# Patient Record
Sex: FEMALE | Race: OTHER | HISPANIC OR LATINO | URBAN - METROPOLITAN AREA
[De-identification: names, ages, dates, MRNs, and addresses within clinical notes are randomized per-mention and may not be internally consistent; named-entity substitution may affect disease eponyms.]

---

## 2019-12-05 VITALS
TEMPERATURE: 98 F | SYSTOLIC BLOOD PRESSURE: 104 MMHG | DIASTOLIC BLOOD PRESSURE: 68 MMHG | WEIGHT: 127.65 LBS | HEIGHT: 65 IN | HEART RATE: 89 BPM | OXYGEN SATURATION: 100 % | RESPIRATION RATE: 16 BRPM

## 2019-12-05 RX ORDER — INFLUENZA VIRUS VACCINE 15; 15; 15; 15 UG/.5ML; UG/.5ML; UG/.5ML; UG/.5ML
0.5 SUSPENSION INTRAMUSCULAR ONCE
Refills: 0 | Status: COMPLETED | OUTPATIENT
Start: 2019-12-06 | End: 2019-12-06

## 2019-12-05 RX ORDER — POVIDONE-IODINE 5 %
1 AEROSOL (ML) TOPICAL ONCE
Refills: 0 | Status: COMPLETED | OUTPATIENT
Start: 2019-12-06 | End: 2019-12-06
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
## 2019-12-05 NOTE — H&P ADULT - PROBLEM SELECTOR PLAN 1
Admit to Orthopedic Service   For elective posterior spinal fusion, lumbar laminectomy, bone graft L4-S1   Medically cleared and optimized for surgery by Admit to Orthopedic Service   For elective posterior spinal fusion, lumbar laminectomy, bone graft L4-S1 with Dr. Rodriguez   Medically cleared and optimized for surgery by Dr. Shields

## 2019-12-05 NOTE — H&P ADULT - NSHPLABSRESULTS_GEN_ALL_CORE
Preop CBC, BMP, PT/INR, within normal range  PTT DOS   UA negative   3M DOS   T + S DOS   Preop CXR within normal limits, reviewed per medical clearance   Preop EKG NSR and reviewed per medical clearance

## 2019-12-05 NOTE — H&P ADULT - NSHPPHYSICALEXAM_GEN_ALL_CORE
Gen: 45 y/o female, well nourished, well developed, NAD  MSK: Decreased ROM secondary to pain at the lumbar spine     Rest of PE per MD clearance Gen: 45 y/o female, well nourished, well developed, NAD  MSK: Decreased ROM secondary to pain at the lumbar spine   calves soft, nontender bilaterally   sensation intact to light touch bilateral lower extremities  DP 2+,  brisk capillary refill  EHL/FHL/TA/GS 5/5 bilaterally     Rest of PE per MD clearance

## 2019-12-06 ENCOUNTER — INPATIENT (INPATIENT)
Facility: HOSPITAL | Age: 44
LOS: 4 days | Discharge: HOME | DRG: 460 | End: 2019-12-11
Attending: ORTHOPAEDIC SURGERY | Admitting: ORTHOPAEDIC SURGERY
Payer: COMMERCIAL

## 2019-12-06 DIAGNOSIS — M54.17 RADICULOPATHY, LUMBOSACRAL REGION: ICD-10-CM

## 2019-12-06 DIAGNOSIS — E86.0 DEHYDRATION: ICD-10-CM

## 2019-12-06 DIAGNOSIS — L23.1 ALLERGIC CONTACT DERMATITIS DUE TO ADHESIVES: ICD-10-CM

## 2019-12-06 DIAGNOSIS — M51.26 OTHER INTERVERTEBRAL DISC DISPLACEMENT, LUMBAR REGION: ICD-10-CM

## 2019-12-06 DIAGNOSIS — M54.16 RADICULOPATHY, LUMBAR REGION: ICD-10-CM

## 2019-12-06 DIAGNOSIS — Z90.710 ACQUIRED ABSENCE OF BOTH CERVIX AND UTERUS: Chronic | ICD-10-CM

## 2019-12-06 DIAGNOSIS — Z90.710 ACQUIRED ABSENCE OF BOTH CERVIX AND UTERUS: ICD-10-CM

## 2019-12-06 DIAGNOSIS — G99.2 MYELOPATHY IN DISEASES CLASSIFIED ELSEWHERE: ICD-10-CM

## 2019-12-06 DIAGNOSIS — T40.2X5A ADVERSE EFFECT OF OTHER OPIOIDS, INITIAL ENCOUNTER: ICD-10-CM

## 2019-12-06 DIAGNOSIS — R33.9 RETENTION OF URINE, UNSPECIFIED: ICD-10-CM

## 2019-12-06 DIAGNOSIS — R65.10 SYSTEMIC INFLAMMATORY RESPONSE SYNDROME (SIRS) OF NON-INFECTIOUS ORIGIN WITHOUT ACUTE ORGAN DYSFUNCTION: ICD-10-CM

## 2019-12-06 DIAGNOSIS — K59.00 CONSTIPATION, UNSPECIFIED: ICD-10-CM

## 2019-12-06 DIAGNOSIS — M53.2X7 SPINAL INSTABILITIES, LUMBOSACRAL REGION: ICD-10-CM

## 2019-12-06 DIAGNOSIS — M48.07 SPINAL STENOSIS, LUMBOSACRAL REGION: ICD-10-CM

## 2019-12-06 DIAGNOSIS — M48.061 SPINAL STENOSIS, LUMBAR REGION WITHOUT NEUROGENIC CLAUDICATION: ICD-10-CM

## 2019-12-06 DIAGNOSIS — S33.101A DISLOCATION OF UNSPECIFIED LUMBAR VERTEBRA, INITIAL ENCOUNTER: ICD-10-CM

## 2019-12-06 DIAGNOSIS — S33.141A DISLOCATION OF L4/L5 LUMBAR VERTEBRA, INITIAL ENCOUNTER: ICD-10-CM

## 2019-12-06 DIAGNOSIS — I95.9 HYPOTENSION, UNSPECIFIED: ICD-10-CM
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
## 2019-12-06 DIAGNOSIS — R55 SYNCOPE AND COLLAPSE: ICD-10-CM

## 2019-12-06 LAB — GLUCOSE BLDC GLUCOMTR-MCNC: 118 MG/DL — HIGH (ref 70–99)

## 2019-12-06 RX ORDER — ONDANSETRON 8 MG/1
4 TABLET, FILM COATED ORAL EVERY 6 HOURS
Refills: 0 | Status: DISCONTINUED | OUTPATIENT
Start: 2019-12-06 | End: 2019-12-06

## 2019-12-06 RX ORDER — OXYCODONE HYDROCHLORIDE 5 MG/1
10 TABLET ORAL EVERY 4 HOURS
Refills: 0 | Status: DISCONTINUED | OUTPATIENT
Start: 2019-12-06 | End: 2019-12-06

## 2019-12-06 RX ORDER — NALOXONE HYDROCHLORIDE 4 MG/.1ML
0.1 SPRAY NASAL
Refills: 0 | Status: DISCONTINUED | OUTPATIENT
Start: 2019-12-06 | End: 2019-12-11

## 2019-12-06 RX ORDER — CHLORHEXIDINE GLUCONATE 213 G/1000ML
1 SOLUTION TOPICAL ONCE
Refills: 0 | Status: COMPLETED | OUTPATIENT
Start: 2019-12-06 | End: 2019-12-06

## 2019-12-06 RX ORDER — VANCOMYCIN HCL 1 G
1000 VIAL (EA) INTRAVENOUS EVERY 12 HOURS
Refills: 0 | Status: DISCONTINUED | OUTPATIENT
Start: 2019-12-06 | End: 2019-12-06

## 2019-12-06 RX ORDER — ACETAMINOPHEN 500 MG
1000 TABLET ORAL ONCE
Refills: 0 | Status: COMPLETED | OUTPATIENT
Start: 2019-12-06 | End: 2019-12-07

## 2019-12-06 RX ORDER — VANCOMYCIN HCL 1 G
1000 VIAL (EA) INTRAVENOUS EVERY 12 HOURS
Refills: 0 | Status: COMPLETED | OUTPATIENT
Start: 2019-12-06 | End: 2019-12-08

## 2019-12-06 RX ORDER — HYDROMORPHONE HYDROCHLORIDE 2 MG/ML
0.5 INJECTION INTRAMUSCULAR; INTRAVENOUS; SUBCUTANEOUS
Refills: 0 | Status: DISCONTINUED | OUTPATIENT
Start: 2019-12-06 | End: 2019-12-06

## 2019-12-06 RX ORDER — ONDANSETRON 8 MG/1
4 TABLET, FILM COATED ORAL EVERY 6 HOURS
Refills: 0 | Status: DISCONTINUED | OUTPATIENT
Start: 2019-12-06 | End: 2019-12-11

## 2019-12-06 RX ORDER — HYDROMORPHONE HYDROCHLORIDE 2 MG/ML
30 INJECTION INTRAMUSCULAR; INTRAVENOUS; SUBCUTANEOUS
Refills: 0 | Status: DISCONTINUED | OUTPATIENT
Start: 2019-12-06 | End: 2019-12-08

## 2019-12-06 RX ORDER — ACETAMINOPHEN 500 MG
650 TABLET ORAL EVERY 6 HOURS
Refills: 0 | Status: DISCONTINUED | OUTPATIENT
Start: 2019-12-06 | End: 2019-12-09

## 2019-12-06 RX ORDER — SODIUM CHLORIDE 9 MG/ML
1000 INJECTION, SOLUTION INTRAVENOUS
Refills: 0 | Status: DISCONTINUED | OUTPATIENT
Start: 2019-12-06 | End: 2019-12-09

## 2019-12-06 RX ORDER — CYCLOBENZAPRINE HYDROCHLORIDE 10 MG/1
5 TABLET, FILM COATED ORAL THREE TIMES A DAY
Refills: 0 | Status: DISCONTINUED | OUTPATIENT
Start: 2019-12-06 | End: 2019-12-11

## 2019-12-06 RX ORDER — KETOROLAC TROMETHAMINE 30 MG/ML
30 SYRINGE (ML) INJECTION ONCE
Refills: 0 | Status: DISCONTINUED | OUTPATIENT
Start: 2019-12-06 | End: 2019-12-06

## 2019-12-06 RX ORDER — HYDROMORPHONE HYDROCHLORIDE 2 MG/ML
0.5 INJECTION INTRAMUSCULAR; INTRAVENOUS; SUBCUTANEOUS
Refills: 0 | Status: DISCONTINUED | OUTPATIENT
Start: 2019-12-06 | End: 2019-12-08

## 2019-12-06 RX ORDER — SENNA PLUS 8.6 MG/1
2 TABLET ORAL AT BEDTIME
Refills: 0 | Status: DISCONTINUED | OUTPATIENT
Start: 2019-12-06 | End: 2019-12-11

## 2019-12-06 RX ORDER — ACETAMINOPHEN, PHENYLEPHRINE HCL 325; 5 MG/1; MG/1
0 TABLET ORAL
Qty: 0 | Refills: 0 | DISCHARGE

## 2019-12-06 RX ORDER — HYDROMORPHONE HYDROCHLORIDE 2 MG/ML
1 INJECTION INTRAMUSCULAR; INTRAVENOUS; SUBCUTANEOUS EVERY 4 HOURS
Refills: 0 | Status: DISCONTINUED | OUTPATIENT
Start: 2019-12-06 | End: 2019-12-09

## 2019-12-06 RX ADMIN — SODIUM CHLORIDE 80 MILLILITER(S): 9 INJECTION, SOLUTION INTRAVENOUS at 20:06

## 2019-12-06 RX ADMIN — HYDROMORPHONE HYDROCHLORIDE 0.5 MILLIGRAM(S): 2 INJECTION INTRAMUSCULAR; INTRAVENOUS; SUBCUTANEOUS at 12:00

## 2019-12-06 RX ADMIN — HYDROMORPHONE HYDROCHLORIDE 0.5 MILLIGRAM(S): 2 INJECTION INTRAMUSCULAR; INTRAVENOUS; SUBCUTANEOUS at 11:33

## 2019-12-06 RX ADMIN — Medication 1 APPLICATION(S): at 06:45

## 2019-12-06 RX ADMIN — HYDROMORPHONE HYDROCHLORIDE 0.5 MILLIGRAM(S): 2 INJECTION INTRAMUSCULAR; INTRAVENOUS; SUBCUTANEOUS at 11:14

## 2019-12-06 RX ADMIN — SODIUM CHLORIDE 80 MILLILITER(S): 9 INJECTION, SOLUTION INTRAVENOUS at 11:14

## 2019-12-06 RX ADMIN — HYDROMORPHONE HYDROCHLORIDE 0.5 MILLIGRAM(S): 2 INJECTION INTRAMUSCULAR; INTRAVENOUS; SUBCUTANEOUS at 12:31

## 2019-12-06 RX ADMIN — HYDROMORPHONE HYDROCHLORIDE 0.5 MILLIGRAM(S): 2 INJECTION INTRAMUSCULAR; INTRAVENOUS; SUBCUTANEOUS at 11:30

## 2019-12-06 RX ADMIN — Medication 30 MILLIGRAM(S): at 13:14

## 2019-12-06 RX ADMIN — HYDROMORPHONE HYDROCHLORIDE 30 MILLILITER(S): 2 INJECTION INTRAMUSCULAR; INTRAVENOUS; SUBCUTANEOUS at 14:00

## 2019-12-06 RX ADMIN — HYDROMORPHONE HYDROCHLORIDE 0.5 MILLIGRAM(S): 2 INJECTION INTRAMUSCULAR; INTRAVENOUS; SUBCUTANEOUS at 12:45

## 2019-12-06 RX ADMIN — Medication 250 MILLIGRAM(S): at 20:06

## 2019-12-06 RX ADMIN — CHLORHEXIDINE GLUCONATE 1 APPLICATION(S): 213 SOLUTION TOPICAL at 06:46

## 2019-12-06 RX ADMIN — CYCLOBENZAPRINE HYDROCHLORIDE 5 MILLIGRAM(S): 10 TABLET, FILM COATED ORAL at 16:59

## 2019-12-06 RX ADMIN — SENNA PLUS 2 TABLET(S): 8.6 TABLET ORAL at 22:18
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
## 2019-12-06 NOTE — PROGRESS NOTE ADULT - SUBJECTIVE AND OBJECTIVE BOX
Ortho Post Op Check    Procedure: PSF L4-S1  Surgeon: Dr. Rodriguez    Pt comfortable without complaints, pain controlled  Denies CP, SOB, N/V, numbness/tingling     Vital Signs Last 24 Hrs  T(C): 36.3 (12-06-19 @ 10:50), Max: 36.3 (12-06-19 @ 10:50)  T(F): 97.4 (12-06-19 @ 10:50), Max: 97.4 (12-06-19 @ 10:50)  HR: 74 (12-06-19 @ 12:33) (67 - 84)  BP: 109/72 (12-06-19 @ 12:33) (105/71 - 131/84)  BP(mean): 86 (12-06-19 @ 12:33) (84 - 101)  RR: 12 (12-06-19 @ 12:33) (12 - 15)  SpO2: 100% (12-06-19 @ 12:33) (97% - 100%)    General: Pt Alert and oriented, NAD  DSG C/D/I back  Pulses intact b/l LE  Sensation intact b/l LE  Motor: EHL/FHL/TA/GS 5/5 b/l    A/P: 44yFemale POD#0 s/p PSF L4-S1  - Stable  - Pain Control  - DVT ppx: scds  - Post op abx: vanco  - PT, WBS: wbat    Ortho Pager [phone]

## 2019-12-06 NOTE — BRIEF OPERATIVE NOTE - NSICDXBRIEFPROCEDURE_GEN_ALL_CORE_FT
PROCEDURES:  Fusion, posterior spinal column, lumbar, 2 levels, posterior approach 06-Dec-2019 07:15:10  Martin Alonzo

## 2019-12-06 NOTE — PACU DISCHARGE NOTE - COMMENTS
pt met criteria for discharge-alert and oriented x4- follow commands and moving all extremities- PCA pump in progress for Pain- s/p L4-S1 posterior fusion with hardware with abdominal pad dressing with betadine - pt tolerating po intake- 675 ml of urine obtained via bladder scan- orthopedic notified- as per MD order- straight cath pt and 800ml of urine obtained-report given to 9 kyle nurse- pt left unit via bed on PACA and supplemental oxygen to 949-1 accompanied by RN and PCa

## 2019-12-06 NOTE — PACU DISCHARGE NOTE - COMMENTS
pt met criteria for discharge-alert and oriented x4- follow commands and moving all extremities- s/p L4-S1 posterior fusion with hardware- back Pain with bilateral lower extremities weakness at baseline- pt hemodynamically stable at present- [facility] pt left unit via stretcher to 540-1

## 2019-12-07 LAB
ANION GAP SERPL CALC-SCNC: 8 MMOL/L — SIGNIFICANT CHANGE UP (ref 5–17)
BASOPHILS # BLD AUTO: 0.02 K/UL — SIGNIFICANT CHANGE UP (ref 0–0.2)
BASOPHILS NFR BLD AUTO: 0.3 % — SIGNIFICANT CHANGE UP (ref 0–2)
BUN SERPL-MCNC: 3 MG/DL — LOW (ref 7–23)
CALCIUM SERPL-MCNC: 8.8 MG/DL — SIGNIFICANT CHANGE UP (ref 8.4–10.5)
CHLORIDE SERPL-SCNC: 103 MMOL/L — SIGNIFICANT CHANGE UP (ref 96–108)
CO2 SERPL-SCNC: 27 MMOL/L — SIGNIFICANT CHANGE UP (ref 22–31)
CREAT SERPL-MCNC: 0.62 MG/DL — SIGNIFICANT CHANGE UP (ref 0.5–1.3)
EOSINOPHIL # BLD AUTO: 0 K/UL — SIGNIFICANT CHANGE UP (ref 0–0.5)
EOSINOPHIL NFR BLD AUTO: 0 % — SIGNIFICANT CHANGE UP (ref 0–6)
GLUCOSE SERPL-MCNC: 138 MG/DL — HIGH (ref 70–99)
HCT VFR BLD CALC: 38.8 % — SIGNIFICANT CHANGE UP (ref 34.5–45)
HGB BLD-MCNC: 12.6 G/DL — SIGNIFICANT CHANGE UP (ref 11.5–15.5)
IMM GRANULOCYTES NFR BLD AUTO: 0.4 % — SIGNIFICANT CHANGE UP (ref 0–1.5)
LYMPHOCYTES # BLD AUTO: 1.64 K/UL — SIGNIFICANT CHANGE UP (ref 1–3.3)
LYMPHOCYTES # BLD AUTO: 23.5 % — SIGNIFICANT CHANGE UP (ref 13–44)
MCHC RBC-ENTMCNC: 31 PG — SIGNIFICANT CHANGE UP (ref 27–34)
MCHC RBC-ENTMCNC: 32.5 GM/DL — SIGNIFICANT CHANGE UP (ref 32–36)
MCV RBC AUTO: 95.3 FL — SIGNIFICANT CHANGE UP (ref 80–100)
MONOCYTES # BLD AUTO: 0.51 K/UL — SIGNIFICANT CHANGE UP (ref 0–0.9)
MONOCYTES NFR BLD AUTO: 7.3 % — SIGNIFICANT CHANGE UP (ref 2–14)
NEUTROPHILS # BLD AUTO: 4.78 K/UL — SIGNIFICANT CHANGE UP (ref 1.8–7.4)
NEUTROPHILS NFR BLD AUTO: 68.5 % — SIGNIFICANT CHANGE UP (ref 43–77)
NRBC # BLD: 0 /100 WBCS — SIGNIFICANT CHANGE UP (ref 0–0)
PLATELET # BLD AUTO: 208 K/UL — SIGNIFICANT CHANGE UP (ref 150–400)
POTASSIUM SERPL-MCNC: 4.2 MMOL/L — SIGNIFICANT CHANGE UP (ref 3.5–5.3)
POTASSIUM SERPL-SCNC: 4.2 MMOL/L — SIGNIFICANT CHANGE UP (ref 3.5–5.3)
RBC # BLD: 4.07 M/UL — SIGNIFICANT CHANGE UP (ref 3.8–5.2)
RBC # FLD: 11.9 % — SIGNIFICANT CHANGE UP (ref 10.3–14.5)
SODIUM SERPL-SCNC: 138 MMOL/L — SIGNIFICANT CHANGE UP (ref 135–145)
WBC # BLD: 6.98 K/UL — SIGNIFICANT CHANGE UP (ref 3.8–10.5)
WBC # FLD AUTO: 6.98 K/UL — SIGNIFICANT CHANGE UP (ref 3.8–10.5)

## 2019-12-07 RX ADMIN — Medication 650 MILLIGRAM(S): at 22:06

## 2019-12-07 RX ADMIN — CYCLOBENZAPRINE HYDROCHLORIDE 5 MILLIGRAM(S): 10 TABLET, FILM COATED ORAL at 18:26

## 2019-12-07 RX ADMIN — HYDROMORPHONE HYDROCHLORIDE 0.5 MILLIGRAM(S): 2 INJECTION INTRAMUSCULAR; INTRAVENOUS; SUBCUTANEOUS at 18:30

## 2019-12-07 RX ADMIN — Medication 400 MILLIGRAM(S): at 11:11

## 2019-12-07 RX ADMIN — SODIUM CHLORIDE 80 MILLILITER(S): 9 INJECTION, SOLUTION INTRAVENOUS at 09:00

## 2019-12-07 RX ADMIN — Medication 250 MILLIGRAM(S): at 19:01

## 2019-12-07 RX ADMIN — Medication 1000 MILLIGRAM(S): at 11:30

## 2019-12-07 RX ADMIN — Medication 650 MILLIGRAM(S): at 21:06

## 2019-12-07 RX ADMIN — CYCLOBENZAPRINE HYDROCHLORIDE 5 MILLIGRAM(S): 10 TABLET, FILM COATED ORAL at 09:50

## 2019-12-07 RX ADMIN — SENNA PLUS 2 TABLET(S): 8.6 TABLET ORAL at 21:07

## 2019-12-07 RX ADMIN — SODIUM CHLORIDE 80 MILLILITER(S): 9 INJECTION, SOLUTION INTRAVENOUS at 23:59

## 2019-12-07 RX ADMIN — Medication 250 MILLIGRAM(S): at 07:30

## 2019-12-07 RX ADMIN — HYDROMORPHONE HYDROCHLORIDE 30 MILLILITER(S): 2 INJECTION INTRAMUSCULAR; INTRAVENOUS; SUBCUTANEOUS at 18:15

## 2019-12-07 NOTE — PHYSICAL THERAPY INITIAL EVALUATION ADULT - GAIT DEVIATIONS NOTED, PT EVAL
decreased step length/increased time in double stance/decreased stride length/decreased swing-to-stance ratio/decreased velocity of limb motion

## 2019-12-07 NOTE — DISCHARGE NOTE PROVIDER - HOSPITAL COURSE
Admitted    Surgery - L4-S1 PSF    Yenifer-op Antibiotics    Pain control    DVT prophylaxis    OOB/Physical Therapy Admitted    Surgery - L4-S1 PSF    Yenifer-op Antibiotics    Pain control    DVT prophylaxis    Urinary retention requiring miller catheter - removed and passed trial of void on 12/10/2019    OOB/Physical Therapy

## 2019-12-07 NOTE — PHYSICAL THERAPY INITIAL EVALUATION ADULT - PLANNED THERAPY INTERVENTIONS, PT EVAL
gait training/postural re-education/strengthening/balance training/transfer training/neuromuscular re-education/bed mobility training

## 2019-12-07 NOTE — PHYSICAL THERAPY INITIAL EVALUATION ADULT - GENERAL OBSERVATIONS, REHAB EVAL
Patient received semi-rocha in bed  in NAD on O2, +SCDs, +IV, +PCA.  Cleared by POLLY Mortensen. Agreeable to PT.

## 2019-12-07 NOTE — DISCHARGE NOTE PROVIDER - NSDCACTIVITY_GEN_ALL_CORE
No heavy lifting/straining/Showering allowed/Walking - Indoors allowed/Do not drive or operate machinery Walking - Outdoors allowed/Walking - Indoors allowed/No heavy lifting/straining/Do not drive or operate machinery/Stairs allowed/Showering allowed

## 2019-12-07 NOTE — PROGRESS NOTE ADULT - SUBJECTIVE AND OBJECTIVE BOX
Ortho Post Op Check    Pt comfortable without complaints, pain controlled. Straight cathx2 overnight, TOV this AM  Denies CP, SOB, N/V, numbness/tingling     Vital Signs Last 24 Hrs  T(C): 36.7 (07 Dec 2019 05:06), Max: 36.7 (07 Dec 2019 05:06)  T(F): 98 (07 Dec 2019 05:06), Max: 98 (07 Dec 2019 05:06)  HR: 69 (07 Dec 2019 05:06) (62 - 86)  BP: 98/61 (07 Dec 2019 05:06) (91/54 - 131/84)  BP(mean): 77 (06 Dec 2019 19:18) (67 - 101)  RR: 15 (07 Dec 2019 05:06) (12 - 15)  SpO2: 99% (07 Dec 2019 05:06) (97% - 100%)    General: Pt Alert and oriented, NAD  DSG C/D/I back  Pulses intact b/l LE  Sensation intact b/l LE  Motor: EHL/FHL/TA/GS 5/5 b/l    A/P: 44yFemale s/p PSF L4-S1 12/6/19  - Stable  - Pain Control  - DVT ppx: scds  - Post op abx: vanco  - PT, WBS: wbat    Ortho Pager [phone]

## 2019-12-07 NOTE — DISCHARGE NOTE PROVIDER - NSDCCPCAREPLAN_GEN_ALL_CORE_FT
PRINCIPAL DISCHARGE DIAGNOSIS  Diagnosis: HNP (herniated nucleus pulposus), lumbar  Assessment and Plan of Treatment: HNP (herniated nucleus pulposus), lumbar
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
## 2019-12-07 NOTE — DISCHARGE NOTE PROVIDER - CARE PROVIDER_API CALL
Pradeep Rodriguez)  Orthopaedic Surgery; Spine Surgery  [address] [place] [address]  Phone: [phone]  Fax: [phone]  Follow Up Time: Pradeep Rodriguez)  Orthopaedic Surgery; Spine Surgery  [address] [place] [address]  Phone: [phone]  Fax: [phone]  Follow Up Time: 1 week

## 2019-12-07 NOTE — DISCHARGE NOTE PROVIDER - NSDCCPTREATMENT_GEN_ALL_CORE_FT
PRINCIPAL PROCEDURE  Procedure: Fusion, posterior spinal column, lumbar, 2 levels, posterior approach  Findings and Treatment:

## 2019-12-07 NOTE — PHYSICAL THERAPY INITIAL EVALUATION ADULT - CRITERIA FOR SKILLED THERAPEUTIC INTERVENTIONS
impairments found/therapy frequency/risk reduction/prevention/anticipated discharge recommendation/predicted duration of therapy intervention/anticipated equipment needs at discharge/functional limitations in following categories

## 2019-12-07 NOTE — DISCHARGE NOTE PROVIDER - NSDCMRMEDTOKEN_GEN_ALL_CORE_FT
ibuprofen 800 mg oral tablet: 1 tab(s) orally 3 times a day  tylenol sinus: acetaminophen 10 mg/mL intravenous solution: 100 milliliter(s) intravenous once  acetaminophen 325 mg oral tablet: 3 tab(s) orally every 8 hours  cyclobenzaprine 5 mg oral tablet: 1 tab(s) orally 3 times a day, As needed, Muscle Spasm MDD:3  oxyCODONE 5 mg oral tablet: 1-2  tab(s) orally every 6 hours, As needed, severe pain MDD:4  senna oral tablet: 2 tab(s) orally once a day (at bedtime) acetaminophen 325 mg oral tablet: 3 tab(s) orally every 8 hours, As Needed mild pain  cyclobenzaprine 5 mg oral tablet: 1 tab(s) orally 3 times a day, As needed, Muscle Spasm MDD:3  oxyCODONE 5 mg oral tablet: 1-2  tab(s) orally every 6 hours, As needed, severe pain MDD:4  senna oral tablet: 2 tab(s) orally once a day (at bedtime)

## 2019-12-07 NOTE — PHYSICAL THERAPY INITIAL EVALUATION ADULT - ADDITIONAL COMMENTS
Patient will be staying with her sister in apartment building with 5-6 steps to enter. Patient reports use of a back brace for comfort with some efficacy. She denies use of a cane or walker for assistance. Denies recent Hx of falls.

## 2019-12-07 NOTE — DISCHARGE NOTE PROVIDER - NSDCFUADDINST_GEN_ALL_CORE_FT
No strenuous activity (bending/twisting), heavy lifting, driving or returning to work until cleared by MD.  Change dressing daily with gauze/tape till post-op day #5, then leave incision open to air.  You may shower post-op day#5, keep incision clean and dry.   Try to have regular bowel movements, take stool softener or laxative if necessary.  May take pepcid or prilosec for upset stomach.  May apply ice to affected areas to decrease swelling.  Call to schedule an appt with Dr. Rodriguez for follow up, if you have staples or sutures they will be removed in office.  Contact your doctor if you experience: fever greater than 101.5, chills, chest pain, difficulty breathing, redness or excessive drainage around the incision, other concerns.  Follow up with your primary care provider. No strenuous activity (bending/twisting), heavy lifting, driving or returning to work until cleared by MD.  You can shower. Keep back dry.  Try to have regular bowel movements, take stool softener or laxative if necessary.  May take pepcid or prilosec for upset stomach.  May apply ice to affected areas to decrease swelling.  Call to schedule an appt with Dr. Rodriguez for follow up, if you have staples or sutures they will be removed in office.  Contact your doctor if you experience: fever greater than 101.5, chills, chest pain, difficulty breathing, redness or excessive drainage around the incision, other concerns.  Follow up with your primary care provider. Neosporin or hydrocortisone to back rash twice a day.  No strenuous activity (bending/twisting), heavy lifting, driving or returning to work until cleared by MD.  You can shower. Keep back dry.  Try to have regular bowel movements, take stool softener or laxative if necessary.  May take pepcid or prilosec for upset stomach.  May apply ice to affected areas to decrease swelling.  Call to schedule an appt with Dr. Rodriguez for follow up, if you have staples or sutures they will be removed in office.  Contact your doctor if you experience: fever greater than 101.5, chills, chest pain, difficulty breathing, redness or excessive drainage around the incision, other concerns.  Follow up with your primary care provider.

## 2019-12-08 LAB
ANION GAP SERPL CALC-SCNC: 7 MMOL/L — SIGNIFICANT CHANGE UP (ref 5–17)
BUN SERPL-MCNC: 2 MG/DL — LOW (ref 7–23)
CALCIUM SERPL-MCNC: 8.8 MG/DL — SIGNIFICANT CHANGE UP (ref 8.4–10.5)
CHLORIDE SERPL-SCNC: 102 MMOL/L — SIGNIFICANT CHANGE UP (ref 96–108)
CO2 SERPL-SCNC: 30 MMOL/L — SIGNIFICANT CHANGE UP (ref 22–31)
CREAT SERPL-MCNC: 0.66 MG/DL — SIGNIFICANT CHANGE UP (ref 0.5–1.3)
GLUCOSE SERPL-MCNC: 105 MG/DL — HIGH (ref 70–99)
HCT VFR BLD CALC: 39.6 % — SIGNIFICANT CHANGE UP (ref 34.5–45)
HGB BLD-MCNC: 12.5 G/DL — SIGNIFICANT CHANGE UP (ref 11.5–15.5)
MCHC RBC-ENTMCNC: 30.4 PG — SIGNIFICANT CHANGE UP (ref 27–34)
MCHC RBC-ENTMCNC: 31.6 GM/DL — LOW (ref 32–36)
MCV RBC AUTO: 96.4 FL — SIGNIFICANT CHANGE UP (ref 80–100)
NRBC # BLD: 0 /100 WBCS — SIGNIFICANT CHANGE UP (ref 0–0)
PLATELET # BLD AUTO: 183 K/UL — SIGNIFICANT CHANGE UP (ref 150–400)
POTASSIUM SERPL-MCNC: 3.6 MMOL/L — SIGNIFICANT CHANGE UP (ref 3.5–5.3)
POTASSIUM SERPL-SCNC: 3.6 MMOL/L — SIGNIFICANT CHANGE UP (ref 3.5–5.3)
RBC # BLD: 4.11 M/UL — SIGNIFICANT CHANGE UP (ref 3.8–5.2)
RBC # FLD: 11.7 % — SIGNIFICANT CHANGE UP (ref 10.3–14.5)
SODIUM SERPL-SCNC: 139 MMOL/L — SIGNIFICANT CHANGE UP (ref 135–145)
WBC # BLD: 6.75 K/UL — SIGNIFICANT CHANGE UP (ref 3.8–10.5)
WBC # FLD AUTO: 6.75 K/UL — SIGNIFICANT CHANGE UP (ref 3.8–10.5)

## 2019-12-08 RX ORDER — TAMSULOSIN HYDROCHLORIDE 0.4 MG/1
0.4 CAPSULE ORAL ONCE
Refills: 0 | Status: COMPLETED | OUTPATIENT
Start: 2019-12-08 | End: 2019-12-08

## 2019-12-08 RX ORDER — OXYCODONE HYDROCHLORIDE 5 MG/1
10 TABLET ORAL EVERY 4 HOURS
Refills: 0 | Status: DISCONTINUED | OUTPATIENT
Start: 2019-12-08 | End: 2019-12-09

## 2019-12-08 RX ORDER — POTASSIUM CHLORIDE 20 MEQ
40 PACKET (EA) ORAL ONCE
Refills: 0 | Status: COMPLETED | OUTPATIENT
Start: 2019-12-08 | End: 2019-12-08

## 2019-12-08 RX ORDER — OXYCODONE HYDROCHLORIDE 5 MG/1
5 TABLET ORAL EVERY 4 HOURS
Refills: 0 | Status: DISCONTINUED | OUTPATIENT
Start: 2019-12-08 | End: 2019-12-09

## 2019-12-08 RX ADMIN — CYCLOBENZAPRINE HYDROCHLORIDE 5 MILLIGRAM(S): 10 TABLET, FILM COATED ORAL at 18:08

## 2019-12-08 RX ADMIN — OXYCODONE HYDROCHLORIDE 10 MILLIGRAM(S): 5 TABLET ORAL at 18:30

## 2019-12-08 RX ADMIN — OXYCODONE HYDROCHLORIDE 10 MILLIGRAM(S): 5 TABLET ORAL at 18:08

## 2019-12-08 RX ADMIN — OXYCODONE HYDROCHLORIDE 10 MILLIGRAM(S): 5 TABLET ORAL at 08:38

## 2019-12-08 RX ADMIN — TAMSULOSIN HYDROCHLORIDE 0.4 MILLIGRAM(S): 0.4 CAPSULE ORAL at 08:39

## 2019-12-08 RX ADMIN — OXYCODONE HYDROCHLORIDE 10 MILLIGRAM(S): 5 TABLET ORAL at 13:33

## 2019-12-08 RX ADMIN — Medication 650 MILLIGRAM(S): at 11:20

## 2019-12-08 RX ADMIN — Medication 650 MILLIGRAM(S): at 19:10

## 2019-12-08 RX ADMIN — Medication 650 MILLIGRAM(S): at 05:03

## 2019-12-08 RX ADMIN — CYCLOBENZAPRINE HYDROCHLORIDE 5 MILLIGRAM(S): 10 TABLET, FILM COATED ORAL at 08:37

## 2019-12-08 RX ADMIN — Medication 650 MILLIGRAM(S): at 04:03

## 2019-12-08 RX ADMIN — Medication 650 MILLIGRAM(S): at 19:30

## 2019-12-08 RX ADMIN — SENNA PLUS 2 TABLET(S): 8.6 TABLET ORAL at 21:44

## 2019-12-08 RX ADMIN — SODIUM CHLORIDE 80 MILLILITER(S): 9 INJECTION, SOLUTION INTRAVENOUS at 13:33

## 2019-12-08 RX ADMIN — Medication 650 MILLIGRAM(S): at 11:40

## 2019-12-08 RX ADMIN — CYCLOBENZAPRINE HYDROCHLORIDE 5 MILLIGRAM(S): 10 TABLET, FILM COATED ORAL at 21:45

## 2019-12-08 RX ADMIN — OXYCODONE HYDROCHLORIDE 10 MILLIGRAM(S): 5 TABLET ORAL at 13:50

## 2019-12-08 RX ADMIN — Medication 40 MILLIEQUIVALENT(S): at 10:08

## 2019-12-08 RX ADMIN — HYDROMORPHONE HYDROCHLORIDE 1 MILLIGRAM(S): 2 INJECTION INTRAMUSCULAR; INTRAVENOUS; SUBCUTANEOUS at 10:08

## 2019-12-08 RX ADMIN — OXYCODONE HYDROCHLORIDE 10 MILLIGRAM(S): 5 TABLET ORAL at 09:15

## 2019-12-08 RX ADMIN — HYDROMORPHONE HYDROCHLORIDE 1 MILLIGRAM(S): 2 INJECTION INTRAMUSCULAR; INTRAVENOUS; SUBCUTANEOUS at 10:20

## 2019-12-08 RX ADMIN — Medication 250 MILLIGRAM(S): at 08:06

## 2019-12-08 NOTE — PROGRESS NOTE ADULT - SUBJECTIVE AND OBJECTIVE BOX
Ortho Post Op Check    Pt comfortable without complaints, pain controlled. Failed TOV, miller placed.   Denies CP, SOB, N/V, numbness/tingling     Vital Signs Last 24 Hrs  T(C): 37.6 (08 Dec 2019 05:34), Max: 38.9 (07 Dec 2019 20:30)  T(F): 99.6 (08 Dec 2019 05:34), Max: 102 (07 Dec 2019 20:30)  HR: 106 (08 Dec 2019 05:34) (80 - 106)  BP: 101/64 (08 Dec 2019 05:34) (97/63 - 115/72)  BP(mean): --  RR: 17 (08 Dec 2019 05:34) (16 - 18)  SpO2: 100% (08 Dec 2019 05:34) (98% - 100%)    General: Pt Alert and oriented, NAD  DSG C/D/I back  Pulses intact b/l LE  Sensation intact b/l LE  Motor: EHL/FHL/TA/GS 5/5 b/l    A/P: 44yFemale s/p PSF L4-S1 12/6/19  - Stable  - Pain Control  - DVT ppx: scds  - Post op abx: vanco  - PT, WBS: wbat  - Dispo: pending PT clearance, for home    Ortho Pager [phone]

## 2019-12-09 LAB
ALBUMIN SERPL ELPH-MCNC: 2.9 G/DL — LOW (ref 3.3–5)
ALBUMIN SERPL ELPH-MCNC: 2.9 G/DL — LOW (ref 3.3–5)
ALP SERPL-CCNC: 47 U/L — SIGNIFICANT CHANGE UP (ref 40–120)
ALP SERPL-CCNC: 51 U/L — SIGNIFICANT CHANGE UP (ref 40–120)
ALT FLD-CCNC: 23 U/L — SIGNIFICANT CHANGE UP (ref 10–45)
ALT FLD-CCNC: 25 U/L — SIGNIFICANT CHANGE UP (ref 10–45)
ANION GAP SERPL CALC-SCNC: 8 MMOL/L — SIGNIFICANT CHANGE UP (ref 5–17)
ANION GAP SERPL CALC-SCNC: 9 MMOL/L — SIGNIFICANT CHANGE UP (ref 5–17)
AST SERPL-CCNC: 36 U/L — SIGNIFICANT CHANGE UP (ref 10–40)
AST SERPL-CCNC: 38 U/L — SIGNIFICANT CHANGE UP (ref 10–40)
BILIRUB DIRECT SERPL-MCNC: <0.2 MG/DL — SIGNIFICANT CHANGE UP (ref 0–0.2)
BILIRUB INDIRECT FLD-MCNC: >0 MG/DL — LOW (ref 0.2–1)
BILIRUB SERPL-MCNC: 0.2 MG/DL — SIGNIFICANT CHANGE UP (ref 0.2–1.2)
BILIRUB SERPL-MCNC: 0.2 MG/DL — SIGNIFICANT CHANGE UP (ref 0.2–1.2)
BUN SERPL-MCNC: 3 MG/DL — LOW (ref 7–23)
BUN SERPL-MCNC: 3 MG/DL — LOW (ref 7–23)
CALCIUM SERPL-MCNC: 8.6 MG/DL — SIGNIFICANT CHANGE UP (ref 8.4–10.5)
CALCIUM SERPL-MCNC: 8.8 MG/DL — SIGNIFICANT CHANGE UP (ref 8.4–10.5)
CHLORIDE SERPL-SCNC: 105 MMOL/L — SIGNIFICANT CHANGE UP (ref 96–108)
CHLORIDE SERPL-SCNC: 106 MMOL/L — SIGNIFICANT CHANGE UP (ref 96–108)
CO2 SERPL-SCNC: 25 MMOL/L — SIGNIFICANT CHANGE UP (ref 22–31)
CO2 SERPL-SCNC: 25 MMOL/L — SIGNIFICANT CHANGE UP (ref 22–31)
CREAT SERPL-MCNC: 0.5 MG/DL — SIGNIFICANT CHANGE UP (ref 0.5–1.3)
CREAT SERPL-MCNC: 0.52 MG/DL — SIGNIFICANT CHANGE UP (ref 0.5–1.3)
GLUCOSE BLDC GLUCOMTR-MCNC: 88 MG/DL — SIGNIFICANT CHANGE UP (ref 70–99)
GLUCOSE SERPL-MCNC: 129 MG/DL — HIGH (ref 70–99)
GLUCOSE SERPL-MCNC: 133 MG/DL — HIGH (ref 70–99)
HCT VFR BLD CALC: 37.6 % — SIGNIFICANT CHANGE UP (ref 34.5–45)
HCT VFR BLD CALC: 37.6 % — SIGNIFICANT CHANGE UP (ref 34.5–45)
HGB BLD-MCNC: 12.1 G/DL — SIGNIFICANT CHANGE UP (ref 11.5–15.5)
HGB BLD-MCNC: 12.5 G/DL — SIGNIFICANT CHANGE UP (ref 11.5–15.5)
MCHC RBC-ENTMCNC: 30.5 PG — SIGNIFICANT CHANGE UP (ref 27–34)
MCHC RBC-ENTMCNC: 30.8 PG — SIGNIFICANT CHANGE UP (ref 27–34)
MCHC RBC-ENTMCNC: 32.2 GM/DL — SIGNIFICANT CHANGE UP (ref 32–36)
MCHC RBC-ENTMCNC: 33.2 GM/DL — SIGNIFICANT CHANGE UP (ref 32–36)
MCV RBC AUTO: 92.6 FL — SIGNIFICANT CHANGE UP (ref 80–100)
MCV RBC AUTO: 94.7 FL — SIGNIFICANT CHANGE UP (ref 80–100)
NRBC # BLD: 0 /100 WBCS — SIGNIFICANT CHANGE UP (ref 0–0)
NRBC # BLD: 0 /100 WBCS — SIGNIFICANT CHANGE UP (ref 0–0)
PLATELET # BLD AUTO: 200 K/UL — SIGNIFICANT CHANGE UP (ref 150–400)
PLATELET # BLD AUTO: 206 K/UL — SIGNIFICANT CHANGE UP (ref 150–400)
POTASSIUM SERPL-MCNC: 4.1 MMOL/L — SIGNIFICANT CHANGE UP (ref 3.5–5.3)
POTASSIUM SERPL-MCNC: 4.1 MMOL/L — SIGNIFICANT CHANGE UP (ref 3.5–5.3)
POTASSIUM SERPL-SCNC: 4.1 MMOL/L — SIGNIFICANT CHANGE UP (ref 3.5–5.3)
POTASSIUM SERPL-SCNC: 4.1 MMOL/L — SIGNIFICANT CHANGE UP (ref 3.5–5.3)
PROT SERPL-MCNC: 5.5 G/DL — LOW (ref 6–8.3)
PROT SERPL-MCNC: 5.6 G/DL — LOW (ref 6–8.3)
RBC # BLD: 3.97 M/UL — SIGNIFICANT CHANGE UP (ref 3.8–5.2)
RBC # BLD: 4.06 M/UL — SIGNIFICANT CHANGE UP (ref 3.8–5.2)
RBC # FLD: 11.6 % — SIGNIFICANT CHANGE UP (ref 10.3–14.5)
RBC # FLD: 11.7 % — SIGNIFICANT CHANGE UP (ref 10.3–14.5)
SODIUM SERPL-SCNC: 138 MMOL/L — SIGNIFICANT CHANGE UP (ref 135–145)
SODIUM SERPL-SCNC: 140 MMOL/L — SIGNIFICANT CHANGE UP (ref 135–145)
WBC # BLD: 5.34 K/UL — SIGNIFICANT CHANGE UP (ref 3.8–10.5)
WBC # BLD: 5.57 K/UL — SIGNIFICANT CHANGE UP (ref 3.8–10.5)
WBC # FLD AUTO: 5.34 K/UL — SIGNIFICANT CHANGE UP (ref 3.8–10.5)
WBC # FLD AUTO: 5.57 K/UL — SIGNIFICANT CHANGE UP (ref 3.8–10.5)

## 2019-12-09 PROCEDURE — 99232 SBSQ HOSP IP/OBS MODERATE 35: CPT

## 2019-12-09 PROCEDURE — 74019 RADEX ABDOMEN 2 VIEWS: CPT | Mod: 26

## 2019-12-09 PROCEDURE — 93010 ELECTROCARDIOGRAM REPORT: CPT

## 2019-12-09 PROCEDURE — 99255 IP/OBS CONSLTJ NEW/EST HI 80: CPT

## 2019-12-09 RX ORDER — SODIUM CHLORIDE 9 MG/ML
500 INJECTION INTRAMUSCULAR; INTRAVENOUS; SUBCUTANEOUS ONCE
Refills: 0 | Status: COMPLETED | OUTPATIENT
Start: 2019-12-09 | End: 2019-12-09

## 2019-12-09 RX ORDER — SODIUM CHLORIDE 9 MG/ML
1000 INJECTION INTRAMUSCULAR; INTRAVENOUS; SUBCUTANEOUS ONCE
Refills: 0 | Status: COMPLETED | OUTPATIENT
Start: 2019-12-09 | End: 2019-12-09

## 2019-12-09 RX ORDER — ACETAMINOPHEN 500 MG
975 TABLET ORAL EVERY 8 HOURS
Refills: 0 | Status: DISCONTINUED | OUTPATIENT
Start: 2019-12-09 | End: 2019-12-11

## 2019-12-09 RX ORDER — HYDROMORPHONE HYDROCHLORIDE 2 MG/ML
0.5 INJECTION INTRAMUSCULAR; INTRAVENOUS; SUBCUTANEOUS
Refills: 0 | Status: DISCONTINUED | OUTPATIENT
Start: 2019-12-09 | End: 2019-12-09

## 2019-12-09 RX ORDER — OXYCODONE HYDROCHLORIDE 5 MG/1
5 TABLET ORAL EVERY 6 HOURS
Refills: 0 | Status: DISCONTINUED | OUTPATIENT
Start: 2019-12-09 | End: 2019-12-09

## 2019-12-09 RX ORDER — SODIUM CHLORIDE 9 MG/ML
1000 INJECTION, SOLUTION INTRAVENOUS
Refills: 0 | Status: DISCONTINUED | OUTPATIENT
Start: 2019-12-09 | End: 2019-12-11

## 2019-12-09 RX ORDER — ACETAMINOPHEN 500 MG
1000 TABLET ORAL ONCE
Refills: 0 | Status: DISCONTINUED | OUTPATIENT
Start: 2019-12-09 | End: 2019-12-11

## 2019-12-09 RX ORDER — OXYCODONE HYDROCHLORIDE 5 MG/1
5 TABLET ORAL EVERY 6 HOURS
Refills: 0 | Status: DISCONTINUED | OUTPATIENT
Start: 2019-12-09 | End: 2019-12-11

## 2019-12-09 RX ADMIN — Medication 650 MILLIGRAM(S): at 13:52

## 2019-12-09 RX ADMIN — Medication 650 MILLIGRAM(S): at 01:22

## 2019-12-09 RX ADMIN — SODIUM CHLORIDE 500 MILLILITER(S): 9 INJECTION INTRAMUSCULAR; INTRAVENOUS; SUBCUTANEOUS at 10:40

## 2019-12-09 RX ADMIN — Medication 975 MILLIGRAM(S): at 19:15

## 2019-12-09 RX ADMIN — OXYCODONE HYDROCHLORIDE 5 MILLIGRAM(S): 5 TABLET ORAL at 20:38

## 2019-12-09 RX ADMIN — Medication 650 MILLIGRAM(S): at 14:00

## 2019-12-09 RX ADMIN — Medication 650 MILLIGRAM(S): at 02:22

## 2019-12-09 RX ADMIN — SODIUM CHLORIDE 1000 MILLILITER(S): 9 INJECTION INTRAMUSCULAR; INTRAVENOUS; SUBCUTANEOUS at 06:20

## 2019-12-09 RX ADMIN — SODIUM CHLORIDE 100 MILLILITER(S): 9 INJECTION, SOLUTION INTRAVENOUS at 19:16

## 2019-12-09 RX ADMIN — Medication 975 MILLIGRAM(S): at 19:20

## 2019-12-09 RX ADMIN — OXYCODONE HYDROCHLORIDE 10 MILLIGRAM(S): 5 TABLET ORAL at 01:22

## 2019-12-09 RX ADMIN — Medication 1 APPLICATION(S): at 19:15

## 2019-12-09 RX ADMIN — SODIUM CHLORIDE 500 MILLILITER(S): 9 INJECTION INTRAMUSCULAR; INTRAVENOUS; SUBCUTANEOUS at 13:52

## 2019-12-09 RX ADMIN — OXYCODONE HYDROCHLORIDE 5 MILLIGRAM(S): 5 TABLET ORAL at 21:38

## 2019-12-09 RX ADMIN — OXYCODONE HYDROCHLORIDE 10 MILLIGRAM(S): 5 TABLET ORAL at 02:22

## 2019-12-09 RX ADMIN — Medication 10 MILLIGRAM(S): at 10:40
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
## 2019-12-09 NOTE — PROGRESS NOTE ADULT - SUBJECTIVE AND OBJECTIVE BOX
Pain Management Progress Note - [facility] [phone]      HPI: Patient seen and examined today, patient in nad. Patient with a history of uterine fibroids, HNP, total hysterectomy, s/p PSF L5-S1. Patient reports lower back pain and surgical site pain, patient reports pain relief with current pain medication regimen. Patient Axox3, denies n,v no s/s o oversedation. Patient reports not tolerating physical therapy over the weekend, patient reports dizzyness when standing, and being unable to tolerated PT.       Pain is _x__ sharp ____dull ___burning _x__achy ___ Intensity: __x__ mild __x_mod ___severe     Location __x__surgical site ____cervical __x___lumbar ____abd ____upper ext____lower ext    Worse with __x__activity _x___movement _____physical therapy___ Rest    Improved with __x__medication _x___rest ____physical therapy      influenza   Vaccine  povidone iodine 5% Nasal Swab  chlorhexidine 2% Cloths  lactated ringers.  acetaminophen   Tablet ..  vancomycin  IVPB  ondansetron Injectable  senna  (Floorstock)  vancomycin  IVPB  HYDROmorphone  Injectable  HYDROmorphone  Injectable  oxyCODONE    IR  HYDROmorphone PCA (1 mG/mL)  HYDROmorphone PCA (1 mG/mL) Rescue Clinician Bolus  naloxone Injectable  ondansetron Injectable  cyclobenzaprine  vancomycin  IVPB  ketorolac   Injectable  acetaminophen  IVPB ..  oxyCODONE    IR  oxyCODONE    IR  tamsulosin  potassium chloride   Powder  HYDROmorphone  Injectable  sodium chloride 0.9% Bolus  acetaminophen  IVPB ..      ROS: Const:  _-__febrile   Eyes:___ENT:___CV: _-__chest pain  Resp: __-__sob  GI:_-__nausea _-__vomiting _-__abd pain ___npo ___clears _x_full diet __bm  :___ Musk: __x_pain ___spasm  Skin:___ Neuro:  _-__xvjhsgub__-_zrskdrczx__-_ numbness _-__weakness __-_paresth  Psych:_-_anxiety  Endo:___ Heme:___Allergy:_________, _x__all others reviewed and negative      PAST MEDICAL & SURGICAL HISTORY:  Uterine fibroid  HNP (herniated nucleus pulposus), lumbar  H/O total hysterectomy      Hemoglobin: 12.5 g/dL (12-08 @ 05:58)        T(C): 36.9 (12-09-19 @ 08:25), Max: 38.2 (12-09-19 @ 00:30)  HR: 98 (12-09-19 @ 08:25) (83 - 124)  BP: 104/69 (12-09-19 @ 08:25) (77/54 - 122/79)  RR: 17 (12-09-19 @ 08:25) (16 - 18)  SpO2: 99% (12-09-19 @ 08:25) (97% - 100%)  Wt(kg): --       PHYSICAL EXAM:  Gen Appearance: __x_no acute distress x___appropriate        Neuro: _x__SILT feet____ EOM Intact Psych: AAOX_3_, _x__mood/affect appropriate        Eyes: _x__conjunctiva WNL  __x___ Pupils equal and round        ENT: _x__ears and nose atraumatic_x__ Hearing grossly intact        Neck: x___trachea midline, no visible masses ___thyroid without palpable mass    Resp: x___Nml WOB____No tactile fremitus ___clear to auscultation    Cardio: _x__extremities free from edema _x___pedal pulses palpable    GI/Abdomen: _x__soft __x___ Nontender__x____Nondistended_____HSM    Lymphatic: ___no palpable nodes in neck  ___no palpable nodes calves and feet    Skin/Wound: ___Incision, __x_Dressing c/d/i,   ____surrounding tissues soft,  ___drain/chest tube present____    Muscular: EHL _5__/5  Gastrocnemius_5__/5    ___absent clubbing/cyanosis        ASSESSMENT: This is a 44y old Female with a history of uterine fibroids, HNP, total hysterectomy, s/p PSF L5-S1, pain managed with current pain medication regimen.       Recommended Treatment PLAN:  1. Oxycodone 5-10mg po Q4h prn moderate to severe pain  2. Dilaudid 0.5mg Q2h IVP prn breakthrough pain  3. Flexeril 5mg PO Q8h prn muscle spasms  Plan discussed with Dr. Abad Pain Management Progress Note - [facility] [phone]      HPI: Patient seen and examined today, patient in nad. Patient with a history of uterine fibroids, HNP, total hysterectomy, s/p PSF L5-S1. Patient reports lower back pain and surgical site pain, patient reports pain relief with current pain medication regimen. Patient Axox3, denies n,v no s/s o oversedation. Patient reports not tolerating physical therapy over the weekend, patient reports dizzyness when standing, and being unable to tolerated PT.  Reviewed findings with Ortho team.       Pain is _x__ sharp ____dull ___burning _x__achy ___ Intensity: __x__ mild __x_mod ___severe     Location __x__surgical site ____cervical __x___lumbar ____abd ____upper ext____lower ext    Worse with __x__activity _x___movement _____physical therapy___ Rest    Improved with __x__medication _x___rest ____physical therapy      influenza   Vaccine  povidone iodine 5% Nasal Swab  chlorhexidine 2% Cloths  lactated ringers.  acetaminophen   Tablet ..  vancomycin  IVPB  ondansetron Injectable  senna  (Floorstock)  vancomycin  IVPB  HYDROmorphone  Injectable  HYDROmorphone  Injectable  oxyCODONE    IR  HYDROmorphone PCA (1 mG/mL)  HYDROmorphone PCA (1 mG/mL) Rescue Clinician Bolus  naloxone Injectable  ondansetron Injectable  cyclobenzaprine  vancomycin  IVPB  ketorolac   Injectable  acetaminophen  IVPB ..  oxyCODONE    IR  oxyCODONE    IR  tamsulosin  potassium chloride   Powder  HYDROmorphone  Injectable  sodium chloride 0.9% Bolus  acetaminophen  IVPB ..      ROS: Const:  _-__febrile   Eyes:___ENT:___CV: _-__chest pain  Resp: __-__sob  GI:_-__nausea _-__vomiting _-__abd pain ___npo ___clears _x_full diet __bm  :___ Musk: __x_pain ___spasm  Skin:___ Neuro:  _-__ufeoblfb__-_lmhhjpiuw__-_ numbness _-__weakness __-_paresth  Psych:_-_anxiety  Endo:___ Heme:___Allergy:_________, _x__all others reviewed and negative      PAST MEDICAL & SURGICAL HISTORY:  Uterine fibroid  HNP (herniated nucleus pulposus), lumbar  H/O total hysterectomy      Hemoglobin: 12.5 g/dL (12-08 @ 05:58)        T(C): 36.9 (12-09-19 @ 08:25), Max: 38.2 (12-09-19 @ 00:30)  HR: 98 (12-09-19 @ 08:25) (83 - 124)  BP: 104/69 (12-09-19 @ 08:25) (77/54 - 122/79)  RR: 17 (12-09-19 @ 08:25) (16 - 18)  SpO2: 99% (12-09-19 @ 08:25) (97% - 100%)  Wt(kg): --       PHYSICAL EXAM:  Gen Appearance: __x_no acute distress x___appropriate        Neuro: _x__SILT feet____ EOM Intact Psych: AAOX_3_, _x__mood/affect appropriate        Eyes: _x__conjunctiva WNL  __x___ Pupils equal and round        ENT: _x__ears and nose atraumatic_x__ Hearing grossly intact        Neck: x___trachea midline, no visible masses ___thyroid without palpable mass    Resp: x___Nml WOB____No tactile fremitus ___clear to auscultation    Cardio: _x__extremities free from edema _x___pedal pulses palpable    GI/Abdomen: _x__soft __x___ Nontender__x____Nondistended_____HSM    Lymphatic: ___no palpable nodes in neck  ___no palpable nodes calves and feet    Skin/Wound: ___Incision, __x_Dressing c/d/i,   ____surrounding tissues soft,  ___drain/chest tube present____    Muscular: EHL _5__/5  Gastrocnemius_5__/5    ___absent clubbing/cyanosis        ASSESSMENT: This is a 44y old Female with a history of uterine fibroids, HNP, total hysterectomy, s/p PSF L5-S1, pain managed with current pain medication regimen.       Recommended Treatment PLAN:  1. Oxycodone 5mg PO Q4h prn severe pain  2. Flexeril 5mg PO Q8h prn muscle spasms  Plan discussed with Dr. Abad Pain Management Progress Note - [facility] [phone]      HPI: Patient seen and examined today, patient in nad. Patient with a history of uterine fibroids, HNP, total hysterectomy, s/p PSF L5-S1. Patient reports lower back pain and surgical site pain, patient reports pain relief with current pain medication regimen. Patient Axox3, denies n,v no s/s o oversedation. Patient reports not tolerating physical therapy over the weekend, patient reports dizzyness when standing, and being unable to tolerated PT.  Reviewed findings with Ortho team.       Pain is _x__ sharp ____dull ___burning _x__achy ___ Intensity: __x__ mild __x_mod ___severe     Location __x__surgical site ____cervical __x___lumbar ____abd ____upper ext____lower ext    Worse with __x__activity _x___movement _____physical therapy___ Rest    Improved with __x__medication _x___rest ____physical therapy      influenza   Vaccine  povidone iodine 5% Nasal Swab  chlorhexidine 2% Cloths  lactated ringers.  acetaminophen   Tablet ..  vancomycin  IVPB  ondansetron Injectable  senna  (Floorstock)  vancomycin  IVPB  HYDROmorphone  Injectable  HYDROmorphone  Injectable  oxyCODONE    IR  HYDROmorphone PCA (1 mG/mL)  HYDROmorphone PCA (1 mG/mL) Rescue Clinician Bolus  naloxone Injectable  ondansetron Injectable  cyclobenzaprine  vancomycin  IVPB  ketorolac   Injectable  acetaminophen  IVPB ..  oxyCODONE    IR  oxyCODONE    IR  tamsulosin  potassium chloride   Powder  HYDROmorphone  Injectable  sodium chloride 0.9% Bolus  acetaminophen  IVPB ..      ROS: Const:  _-__febrile   Eyes:___ENT:___CV: _-__chest pain  Resp: __-__sob  GI:_-__nausea _-__vomiting _-__abd pain ___npo ___clears _x_full diet __bm  :___ Musk: __x_pain ___spasm  Skin:___ Neuro:  _-__mflifqnz__-_ywyvlqbuv__-_ numbness _-__weakness __-_paresth  Psych:_-_anxiety  Endo:___ Heme:___Allergy:_________, _x__all others reviewed and negative      PAST MEDICAL & SURGICAL HISTORY:  Uterine fibroid  HNP (herniated nucleus pulposus), lumbar  H/O total hysterectomy      Hemoglobin: 12.5 g/dL (12-08 @ 05:58)        T(C): 36.9 (12-09-19 @ 08:25), Max: 38.2 (12-09-19 @ 00:30)  HR: 98 (12-09-19 @ 08:25) (83 - 124)  BP: 104/69 (12-09-19 @ 08:25) (77/54 - 122/79)  RR: 17 (12-09-19 @ 08:25) (16 - 18)  SpO2: 99% (12-09-19 @ 08:25) (97% - 100%)  Wt(kg): --       PHYSICAL EXAM:  Gen Appearance: __x_no acute distress x___appropriate        Neuro: _x__SILT feet____ EOM Intact Psych: AAOX_3_, _x__mood/affect appropriate        Eyes: _x__conjunctiva WNL  __x___ Pupils equal and round        ENT: _x__ears and nose atraumatic_x__ Hearing grossly intact        Neck: x___trachea midline, no visible masses ___thyroid without palpable mass    Resp: x___Nml WOB____No tactile fremitus ___clear to auscultation    Cardio: _x__extremities free from edema _x___pedal pulses palpable    GI/Abdomen: _x__soft __x___ Nontender__x____Nondistended_____HSM    Lymphatic: ___no palpable nodes in neck  ___no palpable nodes calves and feet    Skin/Wound: ___Incision, __x_Dressing c/d/i,   ____surrounding tissues soft,  ___drain/chest tube present____    Muscular: EHL _5__/5  Gastrocnemius_5__/5    ___absent clubbing/cyanosis        ASSESSMENT: This is a 44y old Female with a history of uterine fibroids, HNP, total hysterectomy, s/p PSF L5-S1, pain managed with current pain medication regimen.       Recommended Treatment PLAN:  1. Oxycodone 5mg PO Q4h prn severe pain  2. Flexeril 5mg PO Q8h prn muscle spasms  Plan discussed with Dr. Abad at bedside

## 2019-12-09 NOTE — CONSULT NOTE ADULT - ASSESSMENT
44 year old F s/p PSF for lumbar HNP, medicine consulted for hypotension.     1) Pre-syncopal episode: likely 2/2 dehydration and opioid combination, would check orthostatics, urine from miller noted to be dark, would give additional 500 cc bolus and add standing IVF, reduce opioids, obtain EKG  2) Post-op fever: 102F 2 days ago, now trending down, 100.2 rectally when I examined her, likely inflammatory response in setting of recent surgery, please obtain CBC,  3) Abdominal pain: f/u abdominal x-ray, had BM early today, most tender in RUQ, f/u CMP  4) SIRS criteria: with fever and tachycardia, will f/u abdominal x-ray and LFTs, etiology of SIRS is likely post-op inflammation, with abdominal tenderness but likely 2/2 distended bowel from opioids, please check CMP, if elevated liver enzymes would obtain RUQ US to r/o cholecystitis or CT abd/pelvis  5) Post-op state: c/w pain control: reduce opioids, c/w bowel regimen, c/w IS    Will continue to follow

## 2019-12-09 NOTE — PROVIDER CONTACT NOTE (OTHER) - SITUATION
Pt had a syncopal episode while sitting on the commode for 1 sec. Pt taken back to bed and vitals was taken immediately w/ MD at bedside.

## 2019-12-09 NOTE — PROGRESS NOTE ADULT - SUBJECTIVE AND OBJECTIVE BOX
Ortho Progress Note    Pt comfortable without complaints, endorses pain overnight. Failed TOV, straight cath x1 overnight  Denies CP, SOB, N/V, numbness/tingling     Vital Signs Last 24 Hrs  T(C): 36.7 (09 Dec 2019 05:21), Max: 38.2 (09 Dec 2019 00:30)  T(F): 98.1 (09 Dec 2019 05:21), Max: 100.7 (09 Dec 2019 00:30)  HR: 102 (09 Dec 2019 05:21) (86 - 124)  BP: 98/66 (09 Dec 2019 05:21) (96/64 - 122/79)  BP(mean): --  RR: 18 (09 Dec 2019 05:21) (16 - 18)  SpO2: 100% (09 Dec 2019 05:21) (99% - 100%)    General: Pt Alert and oriented, NAD  DSG C/D/I back  Pulses intact b/l LE  Sensation intact b/l LE  Motor: EHL/FHL/TA/GS 5/5 b/l    A/P: 44yFemale s/p PSF L4-S1 12/6/19  - Stable  - Pain Control  - DVT ppx: scds  - Post op abx: vanco  - PT, WBS: wbat  - Dispo: pending PT clearance, for home    Ortho Pager [phone]

## 2019-12-09 NOTE — PROGRESS NOTE ADULT - SUBJECTIVE AND OBJECTIVE BOX
Ortho Note    Pt comfortable still complaining of incisional site pain and paresthesias in b/l thighs.  Pt also complaining of "warmth" on her back by her dressing.  Pt notes a presyncopal episode that occured after getting up with PT.  Pt notes generalized fatigue as well.   Pt states that she has not had a bowel movement for 4 days and is feeling constipated.   Denies CP, SOB, N/V, numbness/tingling down le b/l    Vital Signs Last 24 Hrs  T(C): 36.9 (12-09-19 @ 08:25), Max: 36.9 (12-09-19 @ 08:25)  T(F): 98.5 (12-09-19 @ 08:25), Max: 98.5 (12-09-19 @ 08:25)  HR: 98 (12-09-19 @ 08:25) (98 - 98)  BP: 104/69 (12-09-19 @ 08:25) (104/69 - 104/69)  BP(mean): --  RR: 17 (12-09-19 @ 08:25) (17 - 17)  SpO2: 99% (12-09-19 @ 08:25) (99% - 99%)      General: Pt Alert and oriented, NAD  DSG gauze ioban C/D/I dressing removed and well demarcated erythematous lesion noted on all areas of skin that was in contact with the ioban tape  Pulses:  DPs palpable b/l le   Sensation:   SILT throughout distal le  Motor: EHL/FHL/TA/GS  5/5 b/l                          12.5   6.75  )-----------( 183      ( 08 Dec 2019 05:58 )             39.6   08 Dec 2019 05:58    139    |  102    |  2      ----------------------------<  105    3.6     |  30     |  0.66           A/P: 44yFemale POD# 3 s/p  PSF L4-S1  - Stable  - Pain Control as needed  - DVT ppx:  scds   - PT, WBS:  WBAT  - Trend labs  - Continue miller for now as pt is not ambulating with PT  - Paresthesias likely 2/2 OR positioning  - suppository given for constipation + BM  - Dispo pending PT eval    Presyncopal episode  - Pt bolused 1L will continue maintenance fluids  - Miller will remain in until pt able to ambulate with PT  - Medicine consulted for multiple presyncopal episodes will appreciate recs      Contact dermititis  - Pt has known allergy to tape dressing removed and replaced with paper tape      Pt and plan discussed with Dr. Rodriguez    Ortho Pager [phone]

## 2019-12-10 LAB
ANION GAP SERPL CALC-SCNC: 8 MMOL/L — SIGNIFICANT CHANGE UP (ref 5–17)
BUN SERPL-MCNC: 3 MG/DL — LOW (ref 7–23)
CALCIUM SERPL-MCNC: 8.7 MG/DL — SIGNIFICANT CHANGE UP (ref 8.4–10.5)
CHLORIDE SERPL-SCNC: 107 MMOL/L — SIGNIFICANT CHANGE UP (ref 96–108)
CO2 SERPL-SCNC: 24 MMOL/L — SIGNIFICANT CHANGE UP (ref 22–31)
CREAT SERPL-MCNC: 0.51 MG/DL — SIGNIFICANT CHANGE UP (ref 0.5–1.3)
GLUCOSE SERPL-MCNC: 103 MG/DL — HIGH (ref 70–99)
HCT VFR BLD CALC: 35.5 % — SIGNIFICANT CHANGE UP (ref 34.5–45)
HGB BLD-MCNC: 11.6 G/DL — SIGNIFICANT CHANGE UP (ref 11.5–15.5)
MCHC RBC-ENTMCNC: 30.8 PG — SIGNIFICANT CHANGE UP (ref 27–34)
MCHC RBC-ENTMCNC: 32.7 GM/DL — SIGNIFICANT CHANGE UP (ref 32–36)
MCV RBC AUTO: 94.2 FL — SIGNIFICANT CHANGE UP (ref 80–100)
NRBC # BLD: 0 /100 WBCS — SIGNIFICANT CHANGE UP (ref 0–0)
PLATELET # BLD AUTO: 206 K/UL — SIGNIFICANT CHANGE UP (ref 150–400)
POTASSIUM SERPL-MCNC: 4 MMOL/L — SIGNIFICANT CHANGE UP (ref 3.5–5.3)
POTASSIUM SERPL-SCNC: 4 MMOL/L — SIGNIFICANT CHANGE UP (ref 3.5–5.3)
RBC # BLD: 3.77 M/UL — LOW (ref 3.8–5.2)
RBC # FLD: 11.9 % — SIGNIFICANT CHANGE UP (ref 10.3–14.5)
SODIUM SERPL-SCNC: 139 MMOL/L — SIGNIFICANT CHANGE UP (ref 135–145)
WBC # BLD: 4.49 K/UL — SIGNIFICANT CHANGE UP (ref 3.8–10.5)
WBC # FLD AUTO: 4.49 K/UL — SIGNIFICANT CHANGE UP (ref 3.8–10.5)

## 2019-12-10 PROCEDURE — 99233 SBSQ HOSP IP/OBS HIGH 50: CPT

## 2019-12-10 PROCEDURE — 99231 SBSQ HOSP IP/OBS SF/LOW 25: CPT

## 2019-12-10 RX ORDER — LACTULOSE 10 G/15ML
10 SOLUTION ORAL DAILY
Refills: 0 | Status: DISCONTINUED | OUTPATIENT
Start: 2019-12-10 | End: 2019-12-11

## 2019-12-10 RX ORDER — MAGNESIUM HYDROXIDE 400 MG/1
30 TABLET, CHEWABLE ORAL DAILY
Refills: 0 | Status: DISCONTINUED | OUTPATIENT
Start: 2019-12-10 | End: 2019-12-11

## 2019-12-10 RX ADMIN — OXYCODONE HYDROCHLORIDE 5 MILLIGRAM(S): 5 TABLET ORAL at 18:20

## 2019-12-10 RX ADMIN — SODIUM CHLORIDE 100 MILLILITER(S): 9 INJECTION, SOLUTION INTRAVENOUS at 18:21

## 2019-12-10 RX ADMIN — LACTULOSE 10 GRAM(S): 10 SOLUTION ORAL at 11:13

## 2019-12-10 RX ADMIN — MAGNESIUM HYDROXIDE 30 MILLILITER(S): 400 TABLET, CHEWABLE ORAL at 05:47

## 2019-12-10 RX ADMIN — Medication 1 APPLICATION(S): at 17:15

## 2019-12-10 RX ADMIN — Medication 975 MILLIGRAM(S): at 18:21

## 2019-12-10 RX ADMIN — SENNA PLUS 2 TABLET(S): 8.6 TABLET ORAL at 21:10

## 2019-12-10 RX ADMIN — SODIUM CHLORIDE 100 MILLILITER(S): 9 INJECTION, SOLUTION INTRAVENOUS at 01:04

## 2019-12-10 RX ADMIN — Medication 1 APPLICATION(S): at 05:47

## 2019-12-10 RX ADMIN — Medication 975 MILLIGRAM(S): at 11:12

## 2019-12-10 RX ADMIN — OXYCODONE HYDROCHLORIDE 5 MILLIGRAM(S): 5 TABLET ORAL at 19:11

## 2019-12-10 RX ADMIN — Medication 975 MILLIGRAM(S): at 04:20

## 2019-12-10 RX ADMIN — Medication 975 MILLIGRAM(S): at 11:17

## 2019-12-10 RX ADMIN — CYCLOBENZAPRINE HYDROCHLORIDE 5 MILLIGRAM(S): 10 TABLET, FILM COATED ORAL at 00:39

## 2019-12-10 RX ADMIN — Medication 975 MILLIGRAM(S): at 03:41

## 2019-12-10 RX ADMIN — Medication 975 MILLIGRAM(S): at 18:22

## 2019-12-10 NOTE — PROGRESS NOTE ADULT - SUBJECTIVE AND OBJECTIVE BOX
Pain Management Progress Note - [facility] [phone]      HPI: Patient seen and examined by me.  She looks better today, sitting upright in bed, mother is feeding her breakfast. Patient with a history of uterine fibroids, HNP, total hysterectomy, s/p PSF L5-S1. Patient reports lower back pain and surgical site pain, patient reports pain relief with current pain medication regimen.   She reports pain improved from yesterday  Patient Axox3, denies n,v no s/s o oversedation. Now able to do physical therapy   Less dizziness when standing.      Pain is _x__ sharp ____dull ___burning _x__achy ___ Intensity: __x__ mild __x_mod ___severe     Location __x__surgical site ____cervical __x___lumbar ____abd ____upper ext____lower ext    Worse with __x__activity _x___movement _____physical therapy___ Rest    Improved with __x__medication _x___rest ____physical therapy      influenza   Vaccine  povidone iodine 5% Nasal Swab  chlorhexidine 2% Cloths  lactated ringers.  acetaminophen   Tablet ..  vancomycin  IVPB  ondansetron Injectable  senna  (Floorstock)  vancomycin  IVPB  HYDROmorphone  Injectable  HYDROmorphone  Injectable  oxyCODONE    IR  HYDROmorphone PCA (1 mG/mL)  HYDROmorphone PCA (1 mG/mL) Rescue Clinician Bolus  naloxone Injectable  ondansetron Injectable  cyclobenzaprine  vancomycin  IVPB  ketorolac   Injectable  acetaminophen  IVPB ..  oxyCODONE    IR  oxyCODONE    IR  tamsulosin  potassium chloride   Powder  HYDROmorphone  Injectable  sodium chloride 0.9% Bolus  acetaminophen  IVPB ..      ROS: Const:  _-__febrile   Eyes:___ENT:___CV: _-__chest pain  Resp: __-__sob  GI:_-__nausea _-__vomiting _-__abd pain ___npo ___clears _x_full diet __bm  :___ Musk: __x_pain ___spasm  Skin:___ Neuro:  _-__ourpjzbx__-_ozockjpmn__-_ numbness _-__weakness __-_paresth  Psych:_-_anxiety  Endo:___ Heme:___Allergy:_________, _x__all others reviewed and negative      PAST MEDICAL & SURGICAL HISTORY:  Uterine fibroid  HNP (herniated nucleus pulposus), lumbar  H/O total hysterectomy      ICU Vital Signs Last 24 Hrs  T(C): 36.9 (10 Dec 2019 04:06), Max: 37.9 (09 Dec 2019 13:45)  T(F): 98.5 (10 Dec 2019 04:06), Max: 100.2 (09 Dec 2019 13:45)  HR: 79 (10 Dec 2019 04:06) (79 - 119)  BP: 109/75 (10 Dec 2019 04:06) (95/55 - 109/75)  BP(mean): --  ABP: --  ABP(mean): --  RR: 18 (10 Dec 2019 04:06) (16 - 18)  SpO2: 100% (10 Dec 2019 04:06) (99% - 100%)       PHYSICAL EXAM:  Gen Appearance: __x_no acute distress x___appropriate        Neuro: _x__SILT feet____ EOM Intact Psych: AAOX_3_, _x__mood/affect appropriate        Eyes: _x__conjunctiva WNL  __x___ Pupils equal and round        ENT: _x__ears and nose atraumatic_x__ Hearing grossly intact        Neck: x___trachea midline, no visible masses ___thyroid without palpable mass    Resp: x___Nml WOB____No tactile fremitus ___clear to auscultation    Cardio: _x__extremities free from edema _x___pedal pulses palpable    GI/Abdomen: _x__soft __x___ Nontender__x____Nondistended_____HSM    Lymphatic: ___no palpable nodes in neck  ___no palpable nodes calves and feet    Skin/Wound: ___Incision, __x_Dressing c/d/i,   ____surrounding tissues soft,  ___drain/chest tube present____    Muscular: EHL _5__/5  Gastrocnemius_5__/5    ___absent clubbing/cyanosis        ASSESSMENT: This is a 44y old Female with a history of uterine fibroids, HNP, total hysterectomy, s/p PSF L5-S1, pain improved today and she is able to tolerate PT.        Recommended Treatment PLAN:  1. Continue Oxycodone 5mg PO Q4h prn severe pain  2. Flexeril 5mg PO Q8h prn muscle spasms

## 2019-12-10 NOTE — PROGRESS NOTE ADULT - SUBJECTIVE AND OBJECTIVE BOX
Ortho Note    Pt comfortable states that pain has significantly improved.  Pt states that her paresthesias have improved and she was able to urinate after her miller was removed  Denies CP, SOB, N/V.    Vital Signs Last 24 Hrs  T(C): 36.9 (12-10-19 @ 08:32), Max: 36.9 (12-10-19 @ 08:32)  T(F): 98.4 (12-10-19 @ 08:32), Max: 98.4 (12-10-19 @ 08:32)  HR: 99 (12-10-19 @ 08:32) (99 - 99)  BP: 101/66 (12-10-19 @ 08:32) (101/66 - 101/66)  BP(mean): --  RR: 17 (12-10-19 @ 08:32) (17 - 17)  SpO2: 99% (12-10-19 @ 08:32) (99% - 99%)    General: Pt Alert and oriented, NAD  DSG gauze paper tape C/D/I  Pulses:  brisk cap refill b/l le  Sensation:  SILT throughout le b/l and symmetrically   Motor: EHL/FHL/TA/GS 5/5 b/l                           11.6   4.49  )-----------( 206      ( 10 Dec 2019 06:50 )             35.5   10 Dec 2019 06:50    139    |  107    |  3      ----------------------------<  103    4.0     |  24     |  0.51       TPro  5.5    /  Alb  2.9    /  TBili  0.2    /  DBili  <0.2   /  AST  36     /  ALT  23     /  AlkPhos  47     09 Dec 2019 15:51      A/P: 44yFemale POD#4 s/p PSF L4-S1  - Stable   - Pain Control as needed  - DVT ppx:  scds  - PT, WBS:  WBAT b/l le  - continue to trend labs  - miller dced pt passed TOV  - Dispo home with HPT pending PT clearance  Ortho Pager [phone]

## 2019-12-10 NOTE — PROVIDER CONTACT NOTE (OTHER) - ASSESSMENT
Pt still complaining of abdominal pain. Pt stated she's passing flatus but unable to have a BM.
Please see flowsheets for vitals. Back is hot to touch.
Pt complained of dizziness, lightheadedness. Denied SOB. Please check flowsheet for vitals.

## 2019-12-10 NOTE — PROGRESS NOTE ADULT - SUBJECTIVE AND OBJECTIVE BOX
OVERNIGHT EVENTS:    SUBJECTIVE / INTERVAL HPI: Patient seen and examined at bedside. Appears much more comfortable today. States her pain is controlled, denies abdominal pain. When notified of her abdominal xray findings, she states she has a history of having indigestion and gas.     VITAL SIGNS:  Vital Signs Last 24 Hrs  T(C): 37 (10 Dec 2019 14:17), Max: 37.3 (09 Dec 2019 17:08)  T(F): 98.6 (10 Dec 2019 14:17), Max: 99.2 (09 Dec 2019 17:08)  HR: 94 (10 Dec 2019 14:17) (79 - 99)  BP: 102/59 (10 Dec 2019 14:17) (95/55 - 109/75)  BP(mean): --  RR: 16 (10 Dec 2019 14:17) (16 - 18)  SpO2: 98% (10 Dec 2019 14:17) (98% - 100%)    PHYSICAL EXAM:    General: WDWN, pleasant, AAOx3, MMM  HEENT: NC/AT  Neck: supple  Cardiovascular: +S1/S2; RRR  Respiratory: CTA b/l; no W/R/R  Gastrointestinal: soft, NT/ND;   Extremities: WWP;     MEDICATIONS:  MEDICATIONS  (STANDING):  acetaminophen   Tablet .. 975 milliGRAM(s) Oral every 8 hours  acetaminophen  IVPB .. 1000 milliGRAM(s) IV Intermittent once  influenza   Vaccine 0.5 milliLiter(s) IntraMuscular once  lactated ringers. 1000 milliLiter(s) (100 mL/Hr) IV Continuous <Continuous>  senna 2 Tablet(s) Oral at bedtime  silver sulfADIAZINE 1% Cream 1 Application(s) Topical two times a day    MEDICATIONS  (PRN):  cyclobenzaprine 5 milliGRAM(s) Oral three times a day PRN Muscle Spasm  lactulose Syrup 10 Gram(s) Oral daily PRN constipation  magnesium hydroxide Suspension 30 milliLiter(s) Oral daily PRN Constipation  naloxone Injectable 0.1 milliGRAM(s) IV Push every 3 minutes PRN For ANY of the following changes in patient status:  A. RR LESS THAN 10 breaths per minute, B. Oxygen saturation LESS THAN 90%, C. Sedation score of 6  ondansetron Injectable 4 milliGRAM(s) IV Push every 6 hours PRN Nausea  oxyCODONE    IR 5 milliGRAM(s) Oral every 6 hours PRN breakthrough pain      ALLERGIES:  Allergies    Beef (Rash)  No Known Drug Allergies    Intolerances        LABS:                        11.6   4.49  )-----------( 206      ( 10 Dec 2019 06:50 )             35.5     12-10    139  |  107  |  3<L>  ----------------------------<  103<H>  4.0   |  24  |  0.51    Ca    8.7      10 Dec 2019 06:50    TPro  5.5<L>  /  Alb  2.9<L>  /  TBili  0.2  /  DBili  <0.2  /  AST  36  /  ALT  23  /  AlkPhos  47  12-09        CAPILLARY BLOOD GLUCOSE      POCT Blood Glucose.: 88 mg/dL (09 Dec 2019 10:08)      RADIOLOGY & ADDITIONAL TESTS: Reviewed.    ASSESSMENT:    PLAN:

## 2019-12-10 NOTE — PROVIDER CONTACT NOTE (OTHER) - ACTION/TREATMENT ORDERED:
Milk of magnesia 30ml ordered and given.
1000ml of NS bolus given. 16FR Carpenter reinserted as ordered.
Provider notified. Incentive spirometer encouraged. Turned and repositioned. Ice packs applied and tylenol 650mg PO will be given as ordered.

## 2019-12-10 NOTE — PROGRESS NOTE ADULT - SUBJECTIVE AND OBJECTIVE BOX
Ortho Note    Pt comfortable without complaints, pain controlled  Denies CP, SOB, N/V, numbness/tingling     Vital Signs Last 24 Hrs  T(C): 36.9 (12-10-19 @ 08:32), Max: 36.9 (12-10-19 @ 08:32)  T(F): 98.4 (12-10-19 @ 08:32), Max: 98.4 (12-10-19 @ 08:32)  HR: 99 (12-10-19 @ 08:32) (99 - 99)  BP: 101/66 (12-10-19 @ 08:32) (101/66 - 101/66)  BP(mean): --  RR: 17 (12-10-19 @ 08:32) (17 - 17)  SpO2: 99% (12-10-19 @ 08:32) (99% - 99%)      General: Pt Alert and oriented, NAD  DSG C/D/I  Pulses:  Sensation:  Motor: EHL/FHL/TA/GS                          11.6   4.49  )-----------( 206      ( 10 Dec 2019 06:50 )             35.5   10 Dec 2019 06:50    139    |  107    |  3      ----------------------------<  103    4.0     |  24     |  0.51     Ca    8.7        10 Dec 2019 06:50    TPro  5.5    /  Alb  2.9    /  TBili  0.2    /  DBili  <0.2   /  AST  36     /  ALT  23     /  AlkPhos  47     09 Dec 2019 15:51      A/P: 44yFemale POD# s/p   - Stable  - Pain Control  - DVT ppx:  - PT, WBS:     Ortho Pager [phone]

## 2019-12-10 NOTE — PROGRESS NOTE ADULT - ASSESSMENT
44 year old F s/p PSF for lumbar HNP, medicine consulted for hypotension.     1) Pre-syncopal episode: asymptomatic today, recheck orthostatics, c/w IVF  2) Post-op fever: resolved, likely post-inflammatory from surgery  3) SIRS criteria: resolved, tachycardia and fever likely due to inflammation from surgery,   4) Post-op state: c/w pain control: reduce opioids, c/w bowel regimen, c/w IS

## 2019-12-11 VITALS
RESPIRATION RATE: 17 BRPM | HEART RATE: 979 BPM | SYSTOLIC BLOOD PRESSURE: 109 MMHG | TEMPERATURE: 97 F | DIASTOLIC BLOOD PRESSURE: 73 MMHG | OXYGEN SATURATION: 100 %

## 2019-12-11 PROCEDURE — 99233 SBSQ HOSP IP/OBS HIGH 50: CPT

## 2019-12-11 RX ORDER — CYCLOBENZAPRINE HYDROCHLORIDE 10 MG/1
1 TABLET, FILM COATED ORAL
Qty: 15 | Refills: 0
Start: 2019-12-11 | End: 2019-12-15

## 2019-12-11 RX ORDER — OXYCODONE HYDROCHLORIDE 5 MG/1
1 TABLET ORAL
Qty: 28 | Refills: 0
Start: 2019-12-11 | End: 2019-12-17

## 2019-12-11 RX ORDER — ACETAMINOPHEN 500 MG
3 TABLET ORAL
Qty: 0 | Refills: 0 | DISCHARGE
Start: 2019-12-11

## 2019-12-11 RX ORDER — ACETAMINOPHEN 500 MG
100 TABLET ORAL
Qty: 0 | Refills: 0 | DISCHARGE
Start: 2019-12-11

## 2019-12-11 RX ORDER — SENNA PLUS 8.6 MG/1
2 TABLET ORAL
Qty: 0 | Refills: 0 | DISCHARGE
Start: 2019-12-11

## 2019-12-11 RX ORDER — IBUPROFEN 200 MG
1 TABLET ORAL
Qty: 0 | Refills: 0 | DISCHARGE

## 2019-12-11 RX ADMIN — OXYCODONE HYDROCHLORIDE 5 MILLIGRAM(S): 5 TABLET ORAL at 01:00

## 2019-12-11 RX ADMIN — OXYCODONE HYDROCHLORIDE 5 MILLIGRAM(S): 5 TABLET ORAL at 06:56

## 2019-12-11 RX ADMIN — OXYCODONE HYDROCHLORIDE 5 MILLIGRAM(S): 5 TABLET ORAL at 13:43

## 2019-12-11 RX ADMIN — Medication 975 MILLIGRAM(S): at 03:58

## 2019-12-11 RX ADMIN — Medication 975 MILLIGRAM(S): at 05:00

## 2019-12-11 RX ADMIN — OXYCODONE HYDROCHLORIDE 5 MILLIGRAM(S): 5 TABLET ORAL at 14:35

## 2019-12-11 RX ADMIN — OXYCODONE HYDROCHLORIDE 5 MILLIGRAM(S): 5 TABLET ORAL at 07:39

## 2019-12-11 RX ADMIN — Medication 1 APPLICATION(S): at 06:56

## 2019-12-11 RX ADMIN — Medication 975 MILLIGRAM(S): at 12:30

## 2019-12-11 RX ADMIN — SODIUM CHLORIDE 100 MILLILITER(S): 9 INJECTION, SOLUTION INTRAVENOUS at 03:09

## 2019-12-11 RX ADMIN — Medication 10 MILLIGRAM(S): at 10:53

## 2019-12-11 RX ADMIN — Medication 975 MILLIGRAM(S): at 13:02

## 2019-12-11 RX ADMIN — OXYCODONE HYDROCHLORIDE 5 MILLIGRAM(S): 5 TABLET ORAL at 00:02

## 2019-12-11 NOTE — PROGRESS NOTE ADULT - ASSESSMENT
44 year old F s/p PSF for lumbar HNP, medicine consulted for hypotension.     1) Pre-syncopal episode: resolved  2) Post-op fever: resolved, likely post-inflammatory from surgery  3) Post-op state: c/w pain control, c/w bowel regimen, c/w IS

## 2019-12-11 NOTE — PROGRESS NOTE ADULT - REASON FOR ADMISSION
low back pain

## 2019-12-11 NOTE — PROGRESS NOTE ADULT - SUBJECTIVE AND OBJECTIVE BOX
Ortho Note    Pt comfortable without complaints, pain controlled. Passed TOV yeseterday  Denies CP, SOB, N/V, numbness/tingling     Vital Signs Last 24 Hrs  T(C): 36.8 (11 Dec 2019 04:02), Max: 37.1 (10 Dec 2019 20:16)  T(F): 98.3 (11 Dec 2019 04:02), Max: 98.8 (10 Dec 2019 20:16)  HR: 76 (11 Dec 2019 04:02) (75 - 100)  BP: 100/67 (11 Dec 2019 04:02) (95/66 - 102/59)  BP(mean): --  RR: 18 (11 Dec 2019 04:02) (16 - 18)  SpO2: 100% (11 Dec 2019 04:02) (98% - 100%)  AVSS    General: Pt Alert and oriented, NAD  Back DSG C/D/I  Abd soft  Sensation intact BL LE  Motor strength 5/5 BL LE  2+ DP pulse BL LE  BCR                                     11.6   4.49  )-----------( 206      ( 10 Dec 2019 06:50 )             35.5         A/P: 44yFemale s/p PSF L4-S1 12/6  - Stable  - Pain Control  - DVT ppx: SCDs  - PT, WBS: WBAT  - appreciate med recs  - dispo home likely today    Ortho Pager [phone]

## 2019-12-11 NOTE — DISCHARGE NOTE NURSING/CASE MANAGEMENT/SOCIAL WORK - PATIENT PORTAL LINK FT
You can access the FollowMyHealth Patient Portal offered by [facility] by registering at the following website: [email or web address]. By joining [facility]’s FollowMyHealth portal, you will also be able to view your health information using other applications (apps) compatible with our system.

## 2019-12-11 NOTE — PROGRESS NOTE ADULT - SUBJECTIVE AND OBJECTIVE BOX
OVERNIGHT EVENTS:    SUBJECTIVE / INTERVAL HPI: Patient seen and examined at bedside. Feels well    VITAL SIGNS:  Vital Signs Last 24 Hrs  T(C): 37.1 (11 Dec 2019 08:30), Max: 37.1 (10 Dec 2019 20:16)  T(F): 98.7 (11 Dec 2019 08:30), Max: 98.8 (10 Dec 2019 20:16)  HR: 92 (11 Dec 2019 08:30) (75 - 100)  BP: 94/63 (11 Dec 2019 08:30) (94/63 - 102/59)  BP(mean): --  RR: 18 (11 Dec 2019 08:30) (16 - 18)  SpO2: 100% (11 Dec 2019 08:30) (98% - 100%)    PHYSICAL EXAM:    General: WDWN, pleasant, AAOx3, MMM  HEENT: NC/AT  Neck: supple  Cardiovascular: +S1/S2; RRR  Respiratory: CTA b/l; no W/R/R  Gastrointestinal: soft, NT/ND;   Extremities: WWP;     MEDICATIONS:  MEDICATIONS  (STANDING):  acetaminophen   Tablet .. 975 milliGRAM(s) Oral every 8 hours  acetaminophen  IVPB .. 1000 milliGRAM(s) IV Intermittent once  influenza   Vaccine 0.5 milliLiter(s) IntraMuscular once  lactated ringers. 1000 milliLiter(s) (100 mL/Hr) IV Continuous <Continuous>  senna 2 Tablet(s) Oral at bedtime  silver sulfADIAZINE 1% Cream 1 Application(s) Topical two times a day    MEDICATIONS  (PRN):  bisacodyl Suppository 10 milliGRAM(s) Rectal daily PRN Constipation  cyclobenzaprine 5 milliGRAM(s) Oral three times a day PRN Muscle Spasm  lactulose Syrup 10 Gram(s) Oral daily PRN constipation  magnesium hydroxide Suspension 30 milliLiter(s) Oral daily PRN Constipation  naloxone Injectable 0.1 milliGRAM(s) IV Push every 3 minutes PRN For ANY of the following changes in patient status:  A. RR LESS THAN 10 breaths per minute, B. Oxygen saturation LESS THAN 90%, C. Sedation score of 6  ondansetron Injectable 4 milliGRAM(s) IV Push every 6 hours PRN Nausea  oxyCODONE    IR 5 milliGRAM(s) Oral every 6 hours PRN breakthrough pain      ALLERGIES:  Allergies    Beef (Rash)  No Known Drug Allergies    Intolerances        LABS:                        11.6   4.49  )-----------( 206      ( 10 Dec 2019 06:50 )             35.5     12-10    139  |  107  |  3<L>  ----------------------------<  103<H>  4.0   |  24  |  0.51    Ca    8.7      10 Dec 2019 06:50    TPro  5.5<L>  /  Alb  2.9<L>  /  TBili  0.2  /  DBili  <0.2  /  AST  36  /  ALT  23  /  AlkPhos  47  12-09        CAPILLARY BLOOD GLUCOSE          RADIOLOGY & ADDITIONAL TESTS: Reviewed.    ASSESSMENT:    PLAN:

## 2019-12-17 PROCEDURE — 80053 COMPREHEN METABOLIC PANEL: CPT

## 2019-12-17 PROCEDURE — 97530 THERAPEUTIC ACTIVITIES: CPT

## 2019-12-17 PROCEDURE — 80076 HEPATIC FUNCTION PANEL: CPT

## 2019-12-17 PROCEDURE — 80048 BASIC METABOLIC PNL TOTAL CA: CPT

## 2019-12-17 PROCEDURE — 82962 GLUCOSE BLOOD TEST: CPT

## 2019-12-17 PROCEDURE — 76000 FLUOROSCOPY <1 HR PHYS/QHP: CPT

## 2019-12-17 PROCEDURE — C9399: CPT

## 2019-12-17 PROCEDURE — 97116 GAIT TRAINING THERAPY: CPT

## 2019-12-17 PROCEDURE — 36415 COLL VENOUS BLD VENIPUNCTURE: CPT

## 2019-12-17 PROCEDURE — 74019 RADEX ABDOMEN 2 VIEWS: CPT

## 2019-12-17 PROCEDURE — C1713: CPT

## 2019-12-17 PROCEDURE — 97161 PT EVAL LOW COMPLEX 20 MIN: CPT

## 2019-12-17 PROCEDURE — C1889: CPT

## 2019-12-17 PROCEDURE — 93005 ELECTROCARDIOGRAM TRACING: CPT

## 2019-12-17 PROCEDURE — 85027 COMPLETE CBC AUTOMATED: CPT

## 2019-12-17 PROCEDURE — 85025 COMPLETE CBC W/AUTO DIFF WBC: CPT

## 2019-12-30 ENCOUNTER — OUTPATIENT (OUTPATIENT)
Dept: OUTPATIENT SERVICES | Facility: HOSPITAL | Age: 44
LOS: 1 days | End: 2019-12-30
Payer: COMMERCIAL

## 2019-12-30 DIAGNOSIS — Z90.710 ACQUIRED ABSENCE OF BOTH CERVIX AND UTERUS: Chronic | ICD-10-CM

## 2019-12-30 PROBLEM — M51.26 OTHER INTERVERTEBRAL DISC DISPLACEMENT, LUMBAR REGION: Chronic | Status: ACTIVE | Noted: 2019-12-05

## 2019-12-30 PROBLEM — D25.9 LEIOMYOMA OF UTERUS, UNSPECIFIED: Chronic | Status: ACTIVE | Noted: 2019-12-05

## 2019-12-30 PROCEDURE — 72100 X-RAY EXAM L-S SPINE 2/3 VWS: CPT | Mod: 26

## 2019-12-30 PROCEDURE — 72100 X-RAY EXAM L-S SPINE 2/3 VWS: CPT

## 2020-01-20 ENCOUNTER — OUTPATIENT (OUTPATIENT)
Dept: OUTPATIENT SERVICES | Facility: HOSPITAL | Age: 45
LOS: 1 days | End: 2020-01-20
Payer: COMMERCIAL

## 2020-01-20 DIAGNOSIS — Z90.710 ACQUIRED ABSENCE OF BOTH CERVIX AND UTERUS: Chronic | ICD-10-CM

## 2020-01-20 PROCEDURE — 72100 X-RAY EXAM L-S SPINE 2/3 VWS: CPT | Mod: 26

## 2020-01-20 PROCEDURE — 72100 X-RAY EXAM L-S SPINE 2/3 VWS: CPT

## 2020-06-01 ENCOUNTER — OUTPATIENT (OUTPATIENT)
Dept: OUTPATIENT SERVICES | Facility: HOSPITAL | Age: 45
LOS: 1 days | End: 2020-06-01
Payer: COMMERCIAL

## 2020-06-01 DIAGNOSIS — Z90.710 ACQUIRED ABSENCE OF BOTH CERVIX AND UTERUS: Chronic | ICD-10-CM

## 2020-06-01 PROCEDURE — 72100 X-RAY EXAM L-S SPINE 2/3 VWS: CPT | Mod: 26

## 2020-06-01 PROCEDURE — 72100 X-RAY EXAM L-S SPINE 2/3 VWS: CPT

## 2020-09-14 ENCOUNTER — OUTPATIENT (OUTPATIENT)
Dept: OUTPATIENT SERVICES | Facility: HOSPITAL | Age: 45
LOS: 1 days | End: 2020-09-14
Payer: COMMERCIAL

## 2020-09-14 DIAGNOSIS — Z90.710 ACQUIRED ABSENCE OF BOTH CERVIX AND UTERUS: Chronic | ICD-10-CM

## 2020-09-14 PROCEDURE — 72100 X-RAY EXAM L-S SPINE 2/3 VWS: CPT | Mod: 26

## 2020-09-14 PROCEDURE — 72100 X-RAY EXAM L-S SPINE 2/3 VWS: CPT

## 2022-05-11 ENCOUNTER — OUTPATIENT (OUTPATIENT)
Dept: OUTPATIENT SERVICES | Facility: HOSPITAL | Age: 47
LOS: 1 days | End: 2022-05-11
Payer: MEDICAID

## 2022-05-11 DIAGNOSIS — Z90.710 ACQUIRED ABSENCE OF BOTH CERVIX AND UTERUS: Chronic | ICD-10-CM

## 2022-05-11 PROCEDURE — 72100 X-RAY EXAM L-S SPINE 2/3 VWS: CPT

## 2022-05-11 PROCEDURE — 72100 X-RAY EXAM L-S SPINE 2/3 VWS: CPT | Mod: 26

## 2024-08-30 PROBLEM — Z00.00 ENCOUNTER FOR PREVENTIVE HEALTH EXAMINATION: Status: ACTIVE | Noted: 2024-08-30

## 2024-09-09 ENCOUNTER — APPOINTMENT (OUTPATIENT)
Dept: CARDIOLOGY | Facility: CLINIC | Age: 49
End: 2024-09-09

## 2024-12-05 ENCOUNTER — APPOINTMENT (OUTPATIENT)
Dept: CARDIOLOGY | Facility: CLINIC | Age: 49
End: 2024-12-05

## 2025-03-06 ENCOUNTER — APPOINTMENT (OUTPATIENT)
Dept: CARDIOLOGY | Facility: CLINIC | Age: 50
End: 2025-03-06
Payer: MEDICARE

## 2025-03-06 ENCOUNTER — NON-APPOINTMENT (OUTPATIENT)
Age: 50
End: 2025-03-06

## 2025-03-06 VITALS
HEIGHT: 64 IN | SYSTOLIC BLOOD PRESSURE: 125 MMHG | WEIGHT: 137.44 LBS | OXYGEN SATURATION: 99 % | HEART RATE: 69 BPM | BODY MASS INDEX: 23.46 KG/M2 | TEMPERATURE: 98.3 F | DIASTOLIC BLOOD PRESSURE: 84 MMHG

## 2025-03-06 DIAGNOSIS — Z78.9 OTHER SPECIFIED HEALTH STATUS: ICD-10-CM

## 2025-03-06 PROCEDURE — ZZZZZ: CPT

## 2025-03-06 PROCEDURE — 99205 OFFICE O/P NEW HI 60 MIN: CPT | Mod: 25

## 2025-03-06 PROCEDURE — 93000 ELECTROCARDIOGRAM COMPLETE: CPT

## 2025-03-06 RX ORDER — GABAPENTIN 100 MG/1
CAPSULE ORAL
Refills: 0 | Status: ACTIVE | COMMUNITY

## 2025-03-06 RX ORDER — CYCLOBENZAPRINE HYDROCHLORIDE 7.5 MG/1
TABLET, FILM COATED ORAL
Refills: 0 | Status: ACTIVE | COMMUNITY

## 2025-03-06 RX ORDER — HYDROXYZINE HYDROCHLORIDE 50 MG/1
TABLET ORAL
Refills: 0 | Status: ACTIVE | COMMUNITY

## 2025-03-06 RX ORDER — DULOXETINE HYDROCHLORIDE 30 MG/1
30 CAPSULE, DELAYED RELEASE PELLETS ORAL
Refills: 0 | Status: ACTIVE | COMMUNITY

## 2025-06-11 ENCOUNTER — APPOINTMENT (OUTPATIENT)
Dept: CARDIOLOGY | Facility: CLINIC | Age: 50
End: 2025-06-11